# Patient Record
Sex: MALE | Race: WHITE | URBAN - METROPOLITAN AREA
[De-identification: names, ages, dates, MRNs, and addresses within clinical notes are randomized per-mention and may not be internally consistent; named-entity substitution may affect disease eponyms.]

---

## 2022-02-18 ENCOUNTER — OFFICE VISIT (OUTPATIENT)
Dept: URBAN - METROPOLITAN AREA CLINIC 59 | Facility: CLINIC | Age: 72
End: 2022-02-18
Payer: MEDICARE

## 2022-02-18 DIAGNOSIS — H02.132 SENILE ECTROPION OF RIGHT LOWER LID: ICD-10-CM

## 2022-02-18 DIAGNOSIS — C44.1122 BASAL CELL CARCINOMA SKIN/ RIGHT LOWER EYELID, INC CANTHUS: Primary | ICD-10-CM

## 2022-02-18 PROCEDURE — 99203 OFFICE O/P NEW LOW 30 MIN: CPT | Performed by: OPTOMETRIST

## 2022-02-18 NOTE — IMPRESSION/PLAN
Impression: Basal cell carcinoma skin/ right lower eyelid, inc canthus: C44.1122. (rule our cancerous lesion OD)  Plan: Ulceration along majority of  lower lid margin. Ectropion OD. Referring patient to oculoplastics Dr Reinaldo Ryan or Dr. Brook Oliveira to rule out [facility] or other cancerous lesion.